# Patient Record
Sex: FEMALE | HISPANIC OR LATINO | Employment: FULL TIME | URBAN - METROPOLITAN AREA
[De-identification: names, ages, dates, MRNs, and addresses within clinical notes are randomized per-mention and may not be internally consistent; named-entity substitution may affect disease eponyms.]

---

## 2023-06-19 ENCOUNTER — APPOINTMENT (OUTPATIENT)
Dept: CT IMAGING | Facility: CLINIC | Age: 38
End: 2023-06-19
Attending: PHYSICIAN ASSISTANT

## 2023-06-19 ENCOUNTER — HOSPITAL ENCOUNTER (EMERGENCY)
Facility: CLINIC | Age: 38
Discharge: HOME | End: 2023-06-19
Attending: PHYSICIAN ASSISTANT | Admitting: PHYSICIAN ASSISTANT

## 2023-06-19 ENCOUNTER — APPOINTMENT (OUTPATIENT)
Dept: ULTRASOUND IMAGING | Facility: CLINIC | Age: 38
End: 2023-06-19
Attending: EMERGENCY MEDICINE

## 2023-06-19 VITALS
HEART RATE: 72 BPM | DIASTOLIC BLOOD PRESSURE: 80 MMHG | OXYGEN SATURATION: 99 % | WEIGHT: 141.09 LBS | RESPIRATION RATE: 16 BRPM | SYSTOLIC BLOOD PRESSURE: 123 MMHG | TEMPERATURE: 98.8 F

## 2023-06-19 DIAGNOSIS — N76.0 BACTERIAL VAGINOSIS: ICD-10-CM

## 2023-06-19 DIAGNOSIS — R10.2 PELVIC PAIN IN FEMALE: ICD-10-CM

## 2023-06-19 DIAGNOSIS — B96.89 BACTERIAL VAGINOSIS: ICD-10-CM

## 2023-06-19 LAB
ALBUMIN SERPL BCG-MCNC: 4.2 G/DL (ref 3.5–5.2)
ALBUMIN UR-MCNC: NEGATIVE MG/DL
ALP SERPL-CCNC: 78 U/L (ref 35–104)
ALT SERPL W P-5'-P-CCNC: 22 U/L (ref 0–50)
ANION GAP SERPL CALCULATED.3IONS-SCNC: 12 MMOL/L (ref 7–15)
APPEARANCE UR: CLEAR
AST SERPL W P-5'-P-CCNC: 17 U/L (ref 0–45)
BACTERIA #/AREA URNS HPF: ABNORMAL /HPF
BASOPHILS # BLD AUTO: 0 10E3/UL (ref 0–0.2)
BASOPHILS NFR BLD AUTO: 1 %
BILIRUB SERPL-MCNC: 0.2 MG/DL
BILIRUB UR QL STRIP: NEGATIVE
BUN SERPL-MCNC: 6.5 MG/DL (ref 6–20)
CALCIUM SERPL-MCNC: 9.7 MG/DL (ref 8.6–10)
CHLORIDE SERPL-SCNC: 104 MMOL/L (ref 98–107)
CLUE CELLS: PRESENT
COLOR UR AUTO: ABNORMAL
CREAT SERPL-MCNC: 0.51 MG/DL (ref 0.51–0.95)
DEPRECATED HCO3 PLAS-SCNC: 21 MMOL/L (ref 22–29)
EOSINOPHIL # BLD AUTO: 0.1 10E3/UL (ref 0–0.7)
EOSINOPHIL NFR BLD AUTO: 1 %
ERYTHROCYTE [DISTWIDTH] IN BLOOD BY AUTOMATED COUNT: 15.6 % (ref 10–15)
GFR SERPL CREATININE-BSD FRML MDRD: >90 ML/MIN/1.73M2
GLUCOSE SERPL-MCNC: 103 MG/DL (ref 70–99)
GLUCOSE UR STRIP-MCNC: NEGATIVE MG/DL
HCG UR QL: NEGATIVE
HCT VFR BLD AUTO: 35 % (ref 35–47)
HGB BLD-MCNC: 11 G/DL (ref 11.7–15.7)
HGB UR QL STRIP: NEGATIVE
IMM GRANULOCYTES # BLD: 0 10E3/UL
IMM GRANULOCYTES NFR BLD: 0 %
KETONES UR STRIP-MCNC: NEGATIVE MG/DL
LEUKOCYTE ESTERASE UR QL STRIP: ABNORMAL
LYMPHOCYTES # BLD AUTO: 3.5 10E3/UL (ref 0.8–5.3)
LYMPHOCYTES NFR BLD AUTO: 40 %
MCH RBC QN AUTO: 24.6 PG (ref 26.5–33)
MCHC RBC AUTO-ENTMCNC: 31.4 G/DL (ref 31.5–36.5)
MCV RBC AUTO: 78 FL (ref 78–100)
MONOCYTES # BLD AUTO: 0.7 10E3/UL (ref 0–1.3)
MONOCYTES NFR BLD AUTO: 8 %
MUCOUS THREADS #/AREA URNS LPF: PRESENT /LPF
NEUTROPHILS # BLD AUTO: 4.4 10E3/UL (ref 1.6–8.3)
NEUTROPHILS NFR BLD AUTO: 50 %
NITRATE UR QL: NEGATIVE
NRBC # BLD AUTO: 0 10E3/UL
NRBC BLD AUTO-RTO: 0 /100
PH UR STRIP: 5.5 [PH] (ref 5–7)
PLATELET # BLD AUTO: 359 10E3/UL (ref 150–450)
POTASSIUM SERPL-SCNC: 3.8 MMOL/L (ref 3.4–5.3)
PROT SERPL-MCNC: 7.6 G/DL (ref 6.4–8.3)
RBC # BLD AUTO: 4.48 10E6/UL (ref 3.8–5.2)
RBC URINE: <1 /HPF
SODIUM SERPL-SCNC: 137 MMOL/L (ref 136–145)
SP GR UR STRIP: 1.03 (ref 1–1.03)
SQUAMOUS EPITHELIAL: 1 /HPF
TRICHOMONAS, WET PREP: ABNORMAL
UROBILINOGEN UR STRIP-MCNC: NORMAL MG/DL
WBC # BLD AUTO: 8.6 10E3/UL (ref 4–11)
WBC URINE: 1 /HPF
WBC'S/HIGH POWER FIELD, WET PREP: ABNORMAL
YEAST, WET PREP: ABNORMAL

## 2023-06-19 PROCEDURE — 87210 SMEAR WET MOUNT SALINE/INK: CPT | Performed by: PHYSICIAN ASSISTANT

## 2023-06-19 PROCEDURE — 250N000011 HC RX IP 250 OP 636: Performed by: PHYSICIAN ASSISTANT

## 2023-06-19 PROCEDURE — 76830 TRANSVAGINAL US NON-OB: CPT

## 2023-06-19 PROCEDURE — 85018 HEMOGLOBIN: CPT | Performed by: EMERGENCY MEDICINE

## 2023-06-19 PROCEDURE — 81025 URINE PREGNANCY TEST: CPT | Performed by: PHYSICIAN ASSISTANT

## 2023-06-19 PROCEDURE — 250N000009 HC RX 250: Performed by: PHYSICIAN ASSISTANT

## 2023-06-19 PROCEDURE — 87591 N.GONORRHOEAE DNA AMP PROB: CPT | Performed by: PHYSICIAN ASSISTANT

## 2023-06-19 PROCEDURE — 81001 URINALYSIS AUTO W/SCOPE: CPT | Performed by: EMERGENCY MEDICINE

## 2023-06-19 PROCEDURE — 36415 COLL VENOUS BLD VENIPUNCTURE: CPT | Performed by: EMERGENCY MEDICINE

## 2023-06-19 PROCEDURE — 87491 CHLMYD TRACH DNA AMP PROBE: CPT | Performed by: PHYSICIAN ASSISTANT

## 2023-06-19 PROCEDURE — 99285 EMERGENCY DEPT VISIT HI MDM: CPT | Mod: 25

## 2023-06-19 PROCEDURE — 80053 COMPREHEN METABOLIC PANEL: CPT | Performed by: EMERGENCY MEDICINE

## 2023-06-19 PROCEDURE — 74177 CT ABD & PELVIS W/CONTRAST: CPT

## 2023-06-19 PROCEDURE — 96374 THER/PROPH/DIAG INJ IV PUSH: CPT | Mod: 59

## 2023-06-19 PROCEDURE — 250N000011 HC RX IP 250 OP 636: Performed by: EMERGENCY MEDICINE

## 2023-06-19 RX ORDER — IOPAMIDOL 755 MG/ML
71 INJECTION, SOLUTION INTRAVASCULAR ONCE
Status: COMPLETED | OUTPATIENT
Start: 2023-06-19 | End: 2023-06-19

## 2023-06-19 RX ORDER — KETOROLAC TROMETHAMINE 15 MG/ML
15 INJECTION, SOLUTION INTRAMUSCULAR; INTRAVENOUS ONCE
Status: COMPLETED | OUTPATIENT
Start: 2023-06-19 | End: 2023-06-19

## 2023-06-19 RX ORDER — METRONIDAZOLE 500 MG/1
500 TABLET ORAL 2 TIMES DAILY
Qty: 14 TABLET | Refills: 0 | Status: SHIPPED | OUTPATIENT
Start: 2023-06-19 | End: 2023-06-26

## 2023-06-19 RX ORDER — ONDANSETRON 4 MG/1
4 TABLET, ORALLY DISINTEGRATING ORAL ONCE
Status: COMPLETED | OUTPATIENT
Start: 2023-06-19 | End: 2023-06-19

## 2023-06-19 RX ADMIN — SODIUM CHLORIDE 60 ML: 9 INJECTION, SOLUTION INTRAVENOUS at 19:16

## 2023-06-19 RX ADMIN — ONDANSETRON 4 MG: 4 TABLET, ORALLY DISINTEGRATING ORAL at 18:51

## 2023-06-19 RX ADMIN — IOPAMIDOL 71 ML: 755 INJECTION, SOLUTION INTRAVENOUS at 19:16

## 2023-06-19 RX ADMIN — KETOROLAC TROMETHAMINE 15 MG: 15 INJECTION, SOLUTION INTRAMUSCULAR; INTRAVENOUS at 13:53

## 2023-06-19 ASSESSMENT — ACTIVITIES OF DAILY LIVING (ADL)
ADLS_ACUITY_SCORE: 35

## 2023-06-19 NOTE — ED PROVIDER NOTES
History     Chief Complaint:  Abdominal Pain     A  was used (Kyrgyz).      Raul Hayden is a 37 year old female with a past history of cysts and kidney infections who presents with abdominal pain. The patient states that yesterday ahe developed lower abdominal and pelvic pain. She states that the pain has stayed constant since yesterday. The pain is not crampy or sharp. Patient also has had some nausea and dysuria along with this along with a foul smelling odor when she urinates. She has been taking a tylenol like medication for the pain. She denies any shortness of breath, chest pain, diarrhea, blood stools, vomiting, rashes, or vaginal discharge. She states that a month ago she had simialr symptoms and was diagnosed with a kidney infection. She has had a tubal ligation and c section in the past. She denies any allergies or taking any current medication. She has no PCP.      Independent Historian:   None - Patient Only    Review of External Notes:   No records in Epic/CE for metro area      Medications:    The patient is currently on no regular medications.    Past Medical History:    Cyst   Kidney infection    Past Surgical History:    C section     Physical Exam     Patient Vitals for the past 24 hrs:   BP Temp Temp src Pulse Resp SpO2 Weight   06/19/23 1956 123/80 -- -- 76 -- 99 % --   06/19/23 1338 -- -- -- -- -- -- 64 kg (141 lb 1.5 oz)   06/19/23 1337 129/74 98.8  F (37.1  C) Oral 93 18 100 % --        Physical Exam  Vitals and nursing note reviewed.   Constitutional:       General: She is not in acute distress.     Appearance: Normal appearance. She is not ill-appearing, toxic-appearing or diaphoretic.   HENT:      Head: Normocephalic.      Right Ear: External ear normal.      Left Ear: External ear normal.      Mouth/Throat:      Mouth: Mucous membranes are moist.   Cardiovascular:      Rate and Rhythm: Normal rate and regular rhythm.      Pulses: Normal pulses.       Heart sounds: Normal heart sounds.   Pulmonary:      Effort: Pulmonary effort is normal. No respiratory distress.      Breath sounds: Normal breath sounds.   Abdominal:      General: There is no distension.      Palpations: Abdomen is soft.      Tenderness: There is abdominal tenderness. There is no rebound.      Comments: Mild TTP to lower abd.  No R/R/G/D.    Musculoskeletal:         General: Normal range of motion.      Cervical back: Normal range of motion and neck supple.   Skin:     General: Skin is warm.      Capillary Refill: Capillary refill takes less than 2 seconds.   Neurological:      General: No focal deficit present.      Mental Status: She is alert.   Psychiatric:         Mood and Affect: Mood normal.         Behavior: Behavior normal.         Thought Content: Thought content normal.         Judgment: Judgment normal.       Emergency Department Course     Imaging:  CT Abdomen Pelvis w Contrast   Final Result   IMPRESSION:    1.  No appendicitis.      2.  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            US Pelvis Cmplt w Transvag & Doppler LmtPel Duplex Limited   Final Result   IMPRESSION:  Trace fluid near the left ovary. No acute abnormality   identified.          SEN COX MD            SYSTEM ID:  [id number]         Report per radiology    Laboratory:  Labs Ordered and Resulted from Time of ED Arrival to Time of ED Departure   COMPREHENSIVE METABOLIC PANEL - Abnormal       Result Value    Sodium 137      Potassium 3.8      Chloride 104      Carbon Dioxide (CO2) 21 (*)     Anion Gap 12      Urea Nitrogen 6.5      Creatinine 0.51      Calcium 9.7      Glucose 103 (*)     Alkaline Phosphatase 78      AST 17      ALT 22      Protein Total 7.6      Albumin 4.2      Bilirubin Total 0.2      GFR Estimate >90     ROUTINE UA WITH MICROSCOPIC REFLEX TO CULTURE - Abnormal    Color Urine Light Yellow      Appearance Urine Clear      Glucose Urine Negative      Bilirubin Urine Negative       Ketones Urine Negative      Specific Gravity Urine 1.027      Blood Urine Negative      pH Urine 5.5      Protein Albumin Urine Negative      Urobilinogen Urine Normal      Nitrite Urine Negative      Leukocyte Esterase Urine Trace (*)     Bacteria Urine Few (*)     Mucus Urine Present (*)     RBC Urine <1      WBC Urine 1      Squamous Epithelials Urine 1     CBC WITH PLATELETS AND DIFFERENTIAL - Abnormal    WBC Count 8.6      RBC Count 4.48      Hemoglobin 11.0 (*)     Hematocrit 35.0      MCV 78      MCH 24.6 (*)     MCHC 31.4 (*)     RDW 15.6 (*)     Platelet Count 359      % Neutrophils 50      % Lymphocytes 40      % Monocytes 8      % Eosinophils 1      % Basophils 1      % Immature Granulocytes 0      NRBCs per 100 WBC 0      Absolute Neutrophils 4.4      Absolute Lymphocytes 3.5      Absolute Monocytes 0.7      Absolute Eosinophils 0.1      Absolute Basophils 0.0      Absolute Immature Granulocytes 0.0      Absolute NRBCs 0.0     WET PREPARATION - Abnormal    Trichomonas Absent      Yeast Absent      Clue Cells Present (*)     WBCs/high power field 3+ (*)    HCG QUALITATIVE URINE - Normal    hCG Urine Qualitative Negative     CHLAMYDIA TRACHOMATIS/NEISSERIA GONORRHOEAE BY PCR      Emergency Department Course & Assessments:    Interventions:  Medications   ketorolac (TORADOL) injection 15 mg (15 mg Intravenous $Given 6/19/23 1353)   ondansetron (ZOFRAN ODT) ODT tab 4 mg (4 mg Oral $Given 6/19/23 1851)   iopamidol (ISOVUE-370) solution 71 mL (71 mLs Intravenous $Given 6/19/23 1916)   Saline (60 mLs Intravenous $Given 6/19/23 1916)        Assessments:  1830 Obtained the patients history and performed initial exam  2025 Rechecked the patient and updated her on findings    Independent Interpretation (X-rays, CTs, rhythm strip):  None    Consultations/Discussion of Management or Tests:  ED Course as of 06/19/23 2030 Mon Jun 19, 2023 1957 Called lab to obtain the status of a wet prep for the patient        Social Determinants of Health affecting care:   Primary care follow up and language barrier    Disposition:  The patient was discharged to home.     Impression & Plan      Medical Decision Makin y/o Swiss speaking female presents with her family for evaluation of lower pelvic pain with mild burning with urination and mild vaginal discharge (when asked) and nausea that has been present since yesterday. Similar symptoms a month ago that was treated at unknown clinic with unknown medication.      Exam as above. Well appearing.  Mild TTP to lower pelvic area, no peritoneal signs. Pelvic U/S and labs reviewed that were obtained through PIT, unremarkable including neg UPT and UA.  Discussed will check CT as consider appy although lower susp. Also discussed with pelvic pain, neg UA and mild vaginal discharge will send vaginal swabs although lower susp this is PID.  They are happy with plan     Update: No new complaints.  Family remains at BS.  Pt remains well appearing, repeat vitals  Noted. Discussed reassuring eval here and she is felt stable for discharge.  Discussed will treat for BV. Discussed GC/chlam pending, made aware should be called if positive but to review results at F/U with provider. Provided contact to obtain PCP in the community. Pt and family educated on S/S that should prompt ED re-eval.  Questions answered. Verbalized understanding. Comfortable with plan and appreciative.     Diagnosis:    ICD-10-CM    1. Pelvic pain in female  R10.2       2. Bacterial vaginosis  N76.0     B96.89          Discharge Medications:  New Prescriptions    METRONIDAZOLE (FLAGYL) 500 MG TABLET    Take 1 tablet (500 mg) by mouth 2 times daily for 7 days        Scribe Disclosure:  Giovanny PENNINGTON, am serving as a scribe at 6:20 PM on 2023 to document services personally performed by Snehal Chaudhry PA-C based on my observations and the provider's statements to me.     2023   Snehal Chaudhry PA-C       Snehal Chaudhry PA-C  06/19/23 2034

## 2023-06-19 NOTE — ED TRIAGE NOTES
Patient presents to ED with lower abdominal pain that started yesterday. States she has had pain like this before and was dx with a cyst and kidney infection. Endorses intermittent painful urination.

## 2023-06-19 NOTE — ED NOTES
Rapid Assessment Note    History:   Raul Hayden is a 37 year old female who presents with abdominal pain. The patient states she had onset of pain across her bilateral lower abdomen and nausea yesterday. She had similar pain in the past after a kidney infection. She denies fevers. She endorses onset of intermittent dysuria last week.  She denies diarrhea or emesis. Her last menstrual cycle was . She has history of  section, however no other abdominal surgeries. She is not nursing or breastfeeding.  was used.     Exam:   General:  Alert, interactive  Cardiovascular:  Well perfused  Lungs:  No respiratory distress, no accessory muscle use  Abdomen:  Tenderness across the lower abdomen.   Neuro:  Moving all 4 extremities  Skin:  Warm, dry  Psych:  Normal affect    Plan of Care:   I evaluated the patient and developed an initial plan of care. I discussed this plan and explained that I, or one of my partners, would be returning to complete the evaluation.     I, Monique King, am serving as a scribe to document services personally performed by Eulalia Mcknight MD, based on my observations and the provider's statements to me.    2023  [employer]    Portions of this medical record were completed by a scribe. UPON MY REVIEW AND AUTHENTICATION BY ELECTRONIC SIGNATURE, this confirms (a) I performed the applicable clinical services, and (b) the record is accurate.      Eulalia Mcknight MD  23 [phone]

## 2023-06-20 ENCOUNTER — TELEPHONE (OUTPATIENT)
Dept: EMERGENCY MEDICINE | Facility: CLINIC | Age: 38
End: 2023-06-20

## 2023-06-20 DIAGNOSIS — A74.9 CHLAMYDIA INFECTION: ICD-10-CM

## 2023-06-20 LAB
C TRACH DNA SPEC QL PROBE+SIG AMP: POSITIVE
N GONORRHOEA DNA SPEC QL NAA+PROBE: NEGATIVE

## 2023-06-20 RX ORDER — DOXYCYCLINE 100 MG/1
100 CAPSULE ORAL 2 TIMES DAILY
Qty: 14 CAPSULE | Refills: 0 | Status: SHIPPED | OUTPATIENT
Start: 2023-06-20 | End: 2023-06-27

## 2023-06-20 NOTE — DISCHARGE INSTRUCTIONS
-We have tested you for gonorrhea and chlamydia.  This will take a few days to result.  If results require a change to your current treatment plan, you should be contacted and advised from there. However, be sure to review your results regardless with provider at follow-up (can also review on MyChart).

## 2023-06-20 NOTE — TELEPHONE ENCOUNTER
[facility] Emergency Department/Urgent Care Lab result notification  [Note:  ED Lab Results RN will reference the [facility] Emergency Dept visit note prior to contacting patient AND/OR prior to consulting Emergency Dept Provider.  Highlights of Emergency Dept visit in information summary at the bottom of this telephone note]    1. Reason for call    Notify of lab results    Assess patient symptoms [if necessary]    Review ED Providers recommendations/discharge instructions (if necessary)    Advise per [facility] ED lab result protocol     2. Lab Result (including Rx patient on, if applicable).  If culture, copy of lab report at bottom.  Final N. Gonorrhoeae PCR is [NEGATIVE] AND Chlamydia T PCR is [POSITIVE]  (Specimen source: vaginal swab)  Patient was treated for N. Gonorrhea AND/OR Chlamydia T in the [facility] Emergency Dept  [Yes or No]:  No       [facility] Emergency Dept discharge antibiotic (if prescribed): Flagyl for BV  Recommendations in treatment per [facility] ED lab result Chlamydia T. AND/OR N. Gonorrhea protocol    3. RN Assessment (Patient's current Symptoms):    Time of call: 12:37PM     Assessment: Per the patient who is with her niece who is translating for her, the patient declines a formal .     The patient states she is feeling a bit better. Is currently at the pharmacy to  the Flagyl that was prescribed yesterday.     Verified that the patient is not pregnant or breastfeeding.     4. RN Recommendations/Instructions per [place] ED lab result protocol    [facility] ED lab result protocol used: Chlamydia    Patient was notified of lab result and treatment recommendations    Advised to discontinue current antibiotic;  No, advised to take the Flagyl that was prescribed yesterday and to also start Doxycyline. Advised to not drink any alcohol while on the antibiotics.     Start Rx for Doxycycline 100 mg PO tablet, 1 tablet  (100 mg) by mouth 2 times daily for 7 days sent to [[facility]      RN reviewed information about Chlamydia from protocol patient information and RN reference guide.   STD Patient Instructions:  We recommend that you contact any recent sexual partners within the last 2 months and have them evaluated by a physician.  Avoid sexual activity for 7 to 10 days or until both you and your partner(s) have completed all antibiotic medications.  Contact PCP or return to the Emergency Dept within 24 hours if you are experiencing persistent or recurrent symptoms soon after completing therapy.  We advise that you consider following up with your PCP at approximately 3 months for retesting to be sure the infection has cleared.  Advised to keep her clinic appointment for today as well and to discuss with her provider when she should get another follow up.   Advised to return to ED with any worsening symptoms.   The patient is comfortable with the information given and has no further questions.     5. Please Contact your PCP clinic or return to the Emergency department if your:    Symptoms do not resolve after completing antibiotic.    Symptoms worsen or other concerning symptoms.    Natalee Arias RN  [facility]  Emergency Dept Lab Result RN  Ph# [phone]

## 2023-06-20 NOTE — TELEPHONE ENCOUNTER
[facility] Emergency Department/Urgent Care Lab result notification  [Note:  ED Lab Results RN will reference the [facility] Emergency Dept visit note prior to contacting patient AND/OR prior to consulting Emergency Dept Provider.  Highlights of Emergency Dept visit in information summary at the bottom of this telephone note]    1. Reason for call    Notify of lab results    Assess patient symptoms [if necessary]    Review ED Providers recommendations/discharge instructions (if necessary)    Advise per [facility] ED lab result protocol    2. Lab Result (including Rx patient on, if applicable).  If culture, copy of lab report at bottom.  Final N. Gonorrhoeae PCR is [NEGATIVE] AND Chlamydia T PCR is [POSITIVE]  (Specimen source: vaginal swab)  Patient was treated for N. Gonorrhea AND/OR Chlamydia T in the [facility] Emergency Dept  [Yes or No]:  No       [facility] Emergency Dept discharge antibiotic (if prescribed): Flagyl for BV  Recommendations in treatment per [facility] ED lab result Chlamydia T. AND/OR N. Gonorrhea protocol    3. RN Assessment (Patient's current Symptoms):  Time of call: 12:22PM  Left  message requesting a call back to [facility] ED Lab Result RN at [phone].  RN is available every day between 9 a.m. and 5:30 p.m.      Information summary from Emergency Dept/Urgent Care visit on 6/19/23  Symptoms reported at ED visit (Chief complaint, HPI) Abdominal Pain     A  was used (Japanese).      Raul Hayden is a 37 year old female with a past history of cysts and kidney infections who presents with abdominal pain. The patient states that yesterday ahe developed lower abdominal and pelvic pain. She states that the pain has stayed constant since yesterday. The pain is not crampy or sharp. Patient also has had some nausea and dysuria along with this along with a foul smelling odor when she urinates. She has been  taking a tylenol like medication for the pain. She denies any shortness of breath, chest pain, diarrhea, blood stools, vomiting, rashes, or vaginal discharge. She states that a month ago she had simialr symptoms and was diagnosed with a kidney infection. She has had a tubal ligation and c section in the past. She denies any allergies or taking any current medication. She has no PCP.   Significant Medical hx, if applicable (i.e. CKD, diabetes) None   Allergies No Known Allergies   Weight, if applicable Wt Readings from Last 2 Encounters:   06/19/23 64 kg (141 lb 1.5 oz)      Coumadin/Warfarin [Yes /No] No   Creatinine Level (mg/dl) Creatinine   Date Value Ref Range Status   06/19/2023 0.51 0.51 - 0.95 mg/dL Final      Creatinine clearance (ml/min), if applicable Creatinine clearance cannot be calculated (Unknown ideal weight.)   Pregnant (Yes/No/NA) HCG qual neg 6/19   Breastfeeding (Yes/No/NA) ?   ED providers Impression and Plan (applicable information) 38 y/o Telugu speaking female presents with her family for evaluation of lower pelvic pain with mild burning with urination and mild vaginal discharge (when asked) and nausea that has been present since yesterday. Similar symptoms a month ago that was treated at unknown clinic with unknown medication.       Exam as above. Well appearing.  Mild TTP to lower pelvic area, no peritoneal signs. Pelvic U/S and labs reviewed that were obtained through PIT, unremarkable including neg UPT and UA.  Discussed will check CT as consider appy although lower susp. Also discussed with pelvic pain, neg UA and mild vaginal discharge will send vaginal swabs although lower susp this is PID.  They are happy with plan      Update: No new complaints.  Family remains at BS.  Pt remains well appearing, repeat vitals  Noted. Discussed reassuring eval here and she is felt stable for discharge.  Discussed will treat for BV. Discussed GC/chlam pending, made aware should be called if positive  but to review results at F/U with provider. Provided contact to obtain PCP in the community. Pt and family educated on S/S that should prompt ED re-eval.  Questions answered. Verbalized understanding. Comfortable with plan and appreciative.    ED diagnosis 1. Pelvic pain in female  R10.2         2. Bacterial vaginosis  N76.0                ED provider Snehal Chaudhry PA-C        Copy of Lab report (if applicable)  Chlamydia trachomatis/Neisseria gonorrhoeae by PCR  Order: [id number]   Status: Final result      Visible to patient: No (inaccessible in MyChart)     Specimen Information: Vagina; Swab    1 Result Note          Component Ref Range & Units 1 d ago    Chlamydia Trachomatis Negative Positive Abnormal     Comment: Positive for C. trachomatis rRNA by transcription mediated amplification.   As is true for all non-culture methods, a positive specimen obtained from a patient after therapeutic treatment cannot be interpreted as indicating the presence of viable organism.    Neisseria gonorrhoeae Negative Negative    Comment: Negative for N. gonorrhoeae rRNA by transcription mediated amplification. A negative result by transcription mediated amplification does not preclude the presence of C. trachomatis infection because results are dependent on proper and adequate collection, absence of inhibitors and sufficient rRNA to be detected.   Resulting Agency  [facility]              Specimen Collected: 06/19/23  7:17 PM Last Resulted: 06/20/23 10:54 AM                 Natalee Arias RN  [facility] [place]  Emergency Dept Lab Result RN  Ph# [phone]

## 2023-07-18 ENCOUNTER — APPOINTMENT (OUTPATIENT)
Dept: ULTRASOUND IMAGING | Facility: CLINIC | Age: 38
End: 2023-07-18
Attending: EMERGENCY MEDICINE

## 2023-07-18 ENCOUNTER — HOSPITAL ENCOUNTER (EMERGENCY)
Facility: CLINIC | Age: 38
Discharge: HOME | End: 2023-07-18
Attending: EMERGENCY MEDICINE | Admitting: EMERGENCY MEDICINE

## 2023-07-18 VITALS
HEART RATE: 75 BPM | DIASTOLIC BLOOD PRESSURE: 74 MMHG | WEIGHT: 140 LBS | OXYGEN SATURATION: 100 % | TEMPERATURE: 97.9 F | SYSTOLIC BLOOD PRESSURE: 136 MMHG | RESPIRATION RATE: 16 BRPM

## 2023-07-18 DIAGNOSIS — N39.0 UTI (URINARY TRACT INFECTION) WITH PYURIA: ICD-10-CM

## 2023-07-18 LAB
ALBUMIN UR-MCNC: NEGATIVE MG/DL
ANION GAP SERPL CALCULATED.3IONS-SCNC: 9 MMOL/L (ref 7–15)
APPEARANCE UR: CLEAR
BASOPHILS # BLD AUTO: 0 10E3/UL (ref 0–0.2)
BASOPHILS NFR BLD AUTO: 1 %
BILIRUB UR QL STRIP: NEGATIVE
BUN SERPL-MCNC: 4.2 MG/DL (ref 6–20)
CALCIUM SERPL-MCNC: 9.8 MG/DL (ref 8.6–10)
CHLORIDE SERPL-SCNC: 105 MMOL/L (ref 98–107)
CLUE CELLS: ABNORMAL
COLOR UR AUTO: ABNORMAL
CREAT SERPL-MCNC: 0.49 MG/DL (ref 0.51–0.95)
DEPRECATED HCO3 PLAS-SCNC: 25 MMOL/L (ref 22–29)
EOSINOPHIL # BLD AUTO: 0.1 10E3/UL (ref 0–0.7)
EOSINOPHIL NFR BLD AUTO: 2 %
ERYTHROCYTE [DISTWIDTH] IN BLOOD BY AUTOMATED COUNT: 16.1 % (ref 10–15)
GFR SERPL CREATININE-BSD FRML MDRD: >90 ML/MIN/1.73M2
GLUCOSE SERPL-MCNC: 94 MG/DL (ref 70–99)
GLUCOSE UR STRIP-MCNC: NEGATIVE MG/DL
HCG UR QL: NEGATIVE
HCT VFR BLD AUTO: 34.6 % (ref 35–47)
HGB BLD-MCNC: 10.6 G/DL (ref 11.7–15.7)
HGB UR QL STRIP: NEGATIVE
IMM GRANULOCYTES # BLD: 0 10E3/UL
IMM GRANULOCYTES NFR BLD: 1 %
KETONES UR STRIP-MCNC: NEGATIVE MG/DL
LEUKOCYTE ESTERASE UR QL STRIP: ABNORMAL
LYMPHOCYTES # BLD AUTO: 3.6 10E3/UL (ref 0.8–5.3)
LYMPHOCYTES NFR BLD AUTO: 42 %
MCH RBC QN AUTO: 24.5 PG (ref 26.5–33)
MCHC RBC AUTO-ENTMCNC: 30.6 G/DL (ref 31.5–36.5)
MCV RBC AUTO: 80 FL (ref 78–100)
MONOCYTES # BLD AUTO: 0.8 10E3/UL (ref 0–1.3)
MONOCYTES NFR BLD AUTO: 9 %
MUCOUS THREADS #/AREA URNS LPF: PRESENT /LPF
NEUTROPHILS # BLD AUTO: 4 10E3/UL (ref 1.6–8.3)
NEUTROPHILS NFR BLD AUTO: 45 %
NITRATE UR QL: NEGATIVE
NRBC # BLD AUTO: 0 10E3/UL
NRBC BLD AUTO-RTO: 0 /100
PH UR STRIP: 7 [PH] (ref 5–7)
PLATELET # BLD AUTO: 310 10E3/UL (ref 150–450)
POTASSIUM SERPL-SCNC: 4.2 MMOL/L (ref 3.4–5.3)
RBC # BLD AUTO: 4.33 10E6/UL (ref 3.8–5.2)
RBC URINE: 1 /HPF
SODIUM SERPL-SCNC: 139 MMOL/L (ref 136–145)
SP GR UR STRIP: 1.02 (ref 1–1.03)
SQUAMOUS EPITHELIAL: 3 /HPF
TRICHOMONAS, WET PREP: ABNORMAL
UROBILINOGEN UR STRIP-MCNC: NORMAL MG/DL
WBC # BLD AUTO: 8.7 10E3/UL (ref 4–11)
WBC URINE: 19 /HPF
WBC'S/HIGH POWER FIELD, WET PREP: ABNORMAL
YEAST, WET PREP: ABNORMAL

## 2023-07-18 PROCEDURE — 36415 COLL VENOUS BLD VENIPUNCTURE: CPT | Performed by: EMERGENCY MEDICINE

## 2023-07-18 PROCEDURE — 81001 URINALYSIS AUTO W/SCOPE: CPT | Performed by: EMERGENCY MEDICINE

## 2023-07-18 PROCEDURE — 93976 VASCULAR STUDY: CPT

## 2023-07-18 PROCEDURE — 87210 SMEAR WET MOUNT SALINE/INK: CPT | Performed by: EMERGENCY MEDICINE

## 2023-07-18 PROCEDURE — 87491 CHLMYD TRACH DNA AMP PROBE: CPT | Performed by: EMERGENCY MEDICINE

## 2023-07-18 PROCEDURE — 85025 COMPLETE CBC W/AUTO DIFF WBC: CPT | Performed by: EMERGENCY MEDICINE

## 2023-07-18 PROCEDURE — 99284 EMERGENCY DEPT VISIT MOD MDM: CPT | Mod: 25

## 2023-07-18 PROCEDURE — 80048 BASIC METABOLIC PNL TOTAL CA: CPT | Performed by: EMERGENCY MEDICINE

## 2023-07-18 PROCEDURE — 87088 URINE BACTERIA CULTURE: CPT | Performed by: EMERGENCY MEDICINE

## 2023-07-18 PROCEDURE — 250N000013 HC RX MED GY IP 250 OP 250 PS 637: Performed by: EMERGENCY MEDICINE

## 2023-07-18 PROCEDURE — 87591 N.GONORRHOEAE DNA AMP PROB: CPT | Performed by: EMERGENCY MEDICINE

## 2023-07-18 PROCEDURE — 81025 URINE PREGNANCY TEST: CPT | Performed by: EMERGENCY MEDICINE

## 2023-07-18 RX ORDER — CEPHALEXIN 500 MG/1
1000 CAPSULE ORAL ONCE
Status: COMPLETED | OUTPATIENT
Start: 2023-07-18 | End: 2023-07-18

## 2023-07-18 RX ORDER — CEPHALEXIN 500 MG/1
1000 CAPSULE ORAL ONCE
Status: DISCONTINUED | OUTPATIENT
Start: 2023-07-18 | End: 2023-07-18

## 2023-07-18 RX ORDER — DOXYCYCLINE 100 MG/1
100 CAPSULE ORAL 2 TIMES DAILY
Qty: 20 CAPSULE | Refills: 0 | Status: SHIPPED | OUTPATIENT
Start: 2023-07-18 | End: 2023-07-28

## 2023-07-18 RX ORDER — CEPHALEXIN 500 MG/1
500 CAPSULE ORAL 3 TIMES DAILY
Qty: 21 CAPSULE | Refills: 0 | Status: SHIPPED | OUTPATIENT
Start: 2023-07-18 | End: 2023-07-25

## 2023-07-18 RX ADMIN — CEPHALEXIN 1000 MG: 500 CAPSULE ORAL at 17:52

## 2023-07-18 ASSESSMENT — ACTIVITIES OF DAILY LIVING (ADL): ADLS_ACUITY_SCORE: 35

## 2023-07-18 NOTE — ED NOTES
Tele-PIT/Intake Evaluation      Video-Visit Details    Type of service:  Video Visit    Video Start Time (time video started): 3:15 PM  Video End Time (time video stopped): 3:21 PM   Originating Location (pt. Location):  [facility]  Distant Location (provider location):  [facility]  Mode of Communication:  Video Conference via [employer]  Patient verbally consented to [facility] televisit.    History:  Patient with suprapubic and pelvic pain, present for the last 2 to 3 days, no nausea or vomiting or fevers.  Feels consistent with a previous episode of chlamydia.    Exam:  General:  Alert, interactive  Cardiovascular:  Well perfused  Lungs:  No respiratory distress, no accessory muscle use  Neuro:  Moving all 4 extremities  Skin:  Warm, dry  Psych:  Normal affect  Patient Vitals for the past 24 hrs:   BP Temp Temp src Pulse Resp SpO2 Weight   07/18/23 1512 136/74 97.9  F (36.6  C) Temporal 75 16 100 % 63.5 kg (140 lb)       Appropriate interventions for symptom management were initiated if applicable.  Appropriate diagnostic tests were initiated if indicated.    Important information for subsequent clinician:  Laboratory work-up and ultrasound ordered.    I briefly evaluated the patient and developed an initial plan of care. I discussed this plan and explained that this brief interaction does not constitute a full evaluation. Patient/family understands that they should wait to be fully evaluated and discuss any test results with another clinician prior to leaving the hospital.     Chaparro Traore MD  07/18/23 9891

## 2023-07-18 NOTE — ED PROVIDER NOTES
History     Chief Complaint:  Pelvic Pain       HPI   Raul Hayden is a 37 year old female who presents to the ED with her niece and  for pelvic pain, dysuria, and possible slight increase in vaginal discharge which started on Sunday. She reports a history of UTI.  The patient does have a history of a chlamydial infection in the past.  She was wondering if this could have come back.    Patient does not have any significant fevers chills or night sweats.    A  was used.       Independent Historian:   None - Patient Only    Review of External Notes:   None    Medications:    The patient is not currently taking any prescribed medications.     Past Medical History:    UTI  Chlamydia     Physical Exam     Patient Vitals for the past 24 hrs:   BP Temp Temp src Pulse Resp SpO2 Weight   07/18/23 1512 136/74 97.9  F (36.6  C) Temporal 75 16 100 % 63.5 kg (140 lb)        Physical Exam  General: Resting on the gurney  Head:  The scalp, face, and head appear normal  Eyes:  The pupils are equal, round, and reactive to light    There is no nystagmus    Extraocular muscles are intact    Conjunctivae and sclerae are normal  ENT:    The nose is normal    Pinnae are normal  Neck:  Normal range of motion    There is no rigidity noted    There is no midline cervical spine pain/tenderness    Trachea is in the midline    No mass is detected  CV:  Regular rate and underlying rhythm     Normal S1/S2, no S3/S4    No pathological murmur detected  Resp:  Lungs are clear    There is no tachypnea    Non-labored    No rales    No wheezing   GI:  Abdomen is soft, there is no rigidity    No distension    No tympani    No rebound tenderness     Non-surgical without peritoneal features  Skin:  No rash or acute skin lesions noted  Neuro: Speech is normal and fluent  Psych:  Awake. Alert.      Normal affect.  Appropriate interactions.  Lymph: No anterior cervical lymphadenopathy noted            Emergency  Department Course     Imaging:  US Pelvis Cmplt w Transvag & Doppler LmtPel Duplex Limited   Final Result   IMPRESSION:     1.  No torsion demonstrated.   2.  Simple left paraovarian cyst.                  Report per radiology    Laboratory:  Labs Ordered and Resulted from Time of ED Arrival to Time of ED Departure   BASIC METABOLIC PANEL - Abnormal       Result Value    Sodium 139      Potassium 4.2      Chloride 105      Carbon Dioxide (CO2) 25      Anion Gap 9      Urea Nitrogen 4.2 (*)     Creatinine 0.49 (*)     Calcium 9.8      Glucose 94      GFR Estimate >90     ROUTINE UA WITH MICROSCOPIC REFLEX TO CULTURE - Abnormal    Color Urine Light Yellow      Appearance Urine Clear      Glucose Urine Negative      Bilirubin Urine Negative      Ketones Urine Negative      Specific Gravity Urine 1.020      Blood Urine Negative      pH Urine 7.0      Protein Albumin Urine Negative      Urobilinogen Urine Normal      Nitrite Urine Negative      Leukocyte Esterase Urine Moderate (*)     Mucus Urine Present (*)     RBC Urine 1      WBC Urine 19 (*)     Squamous Epithelials Urine 3 (*)    CBC WITH PLATELETS AND DIFFERENTIAL - Abnormal    WBC Count 8.7      RBC Count 4.33      Hemoglobin 10.6 (*)     Hematocrit 34.6 (*)     MCV 80      MCH 24.5 (*)     MCHC 30.6 (*)     RDW 16.1 (*)     Platelet Count 310      % Neutrophils 45      % Lymphocytes 42      % Monocytes 9      % Eosinophils 2      % Basophils 1      % Immature Granulocytes 1      NRBCs per 100 WBC 0      Absolute Neutrophils 4.0      Absolute Lymphocytes 3.6      Absolute Monocytes 0.8      Absolute Eosinophils 0.1      Absolute Basophils 0.0      Absolute Immature Granulocytes 0.0      Absolute NRBCs 0.0     HCG QUALITATIVE URINE - Normal    hCG Urine Qualitative Negative     WET PREPARATION   CHLAMYDIA TRACHOMATIS PCR   NEISSERIA GONORRHOEAE PCR   URINE CULTURE      Emergency Department Course & Assessments:    Interventions:  Medications   cephALEXin (KEFLEX)  capsule 1,000 mg (has no administration in time range)        Assessments:  1717 I obtained the history and examined the patient as detailed above.     Independent Interpretation (X-rays, CTs, rhythm strip):      Consultations/Discussion of Management or Tests:  None     Social Determinants of Health affecting care:   None    Disposition:  The patient was discharged to home.     Impression & Plan      Medical Decision Making:  This patient presents to the emergency department with dysuria, frequency, and urgency.  She does have a history of UTI and reportedly chlamydia in the past.  Patient provided a urine specimen which shows pyuria likely consistent with bladder infection.  She did provide a swab from the vagina to screen and monitor for possible chlamydial infection which she was concerned about.  I will start her empirically on Keflex and doxycycline while we await results.  Clinical exam is not consistent with a significant abdominal pain or pelvic pain at this time.  There is no obvious clinical evidence of pelvic inflammatory disease.  She is mainly having some subjective dysuria but not a lot of focal suprapubic or pelvic abdominal pain.  We will wait for her urine culture results and the culture nurse will contact the patient if a change in management is needed.    Diagnosis:    ICD-10-CM    1. UTI (urinary tract infection) with pyuria  N39.0            Discharge Medications:  New Prescriptions    CEPHALEXIN (KEFLEX) 500 MG CAPSULE    Take 1 capsule (500 mg) by mouth 3 times daily for 7 days    DOXYCYCLINE HYCLATE (VIBRAMYCIN) 100 MG CAPSULE    Take 1 capsule (100 mg) by mouth 2 times daily for 10 days          Scribe Disclosure:  Fredrick PENNINGTON, am serving as a scribe at 5:50 PM on 7/18/2023 to document services personally performed by Go Marin MD based on my observations and the provider's statements to me.   7/18/2023   Go Marin MD Rock, Michael P, MD  07/18/23 3827

## 2023-07-18 NOTE — DISCHARGE INSTRUCTIONS
Take medications as directed.  We will call you if your urine culture or chlamydia test is positive

## 2023-07-19 LAB
C TRACH DNA SPEC QL NAA+PROBE: NEGATIVE
N GONORRHOEA DNA SPEC QL NAA+PROBE: NEGATIVE

## 2023-07-20 LAB
BACTERIA UR CULT: ABNORMAL
BACTERIA UR CULT: ABNORMAL

## 2023-07-21 ENCOUNTER — TELEPHONE (OUTPATIENT)
Dept: EMERGENCY MEDICINE | Facility: CLINIC | Age: 38
End: 2023-07-21

## 2023-07-21 RX ORDER — CEFPODOXIME PROXETIL 100 MG/1
100 TABLET, FILM COATED ORAL 2 TIMES DAILY
Qty: 10 TABLET | Refills: 0 | Status: SHIPPED | OUTPATIENT
Start: 2023-07-21 | End: 2023-07-26

## 2023-07-21 NOTE — TELEPHONE ENCOUNTER
[facility] Emergency Department/Urgent Care Lab result notification  [Note:  ED Lab Results RN will reference the [facility] Emergency Dept visit note prior to contacting patient AND/OR prior to consulting Emergency Dept Provider.  Highlights of Emergency Dept visit in information summary at the bottom of this telephone note]    1. Reason for call    Notify of lab results    Assess patient symptoms [if necessary]    Review ED Providers recommendations/discharge instructions (if necessary)    Advise per [facility] lab result protocol    2. Lab Result (including Rx patient on, if applicable).  If culture, copy of lab report at bottom.  Final Urine Culture Report on 7/21/23  [facility] Emergency Dept discharge antibiotic prescribed: Cephalexin (Keflex) 500 mg capsule, 1 capsule (500 mg) by mouth 3 times daily for 7 days. AND Doxycycline 100 mg PO tablet, 1 tablet (100 mg) by mouth 2 times daily for 10 days   #1. Bacteria, >100,000 CFU/ML Enterobacter cloacae complex is SUSCEPTIBLE to Antibiotic.    No change in treatment per [facility] lab result Urine Culture protocol.    3. RN Assessment (Patient's current Symptoms):    Time of call: 10:26AM;  Left message with her  requesting a call back    4. RN Recommendations/Instructions per [facility] ED lab result protocol    [facility] lab result protocol used: Urine culture    Kai was not notified of lab result and treatment recommendations  Left voicemail message requesting a call back to [facility] ED Lab Result RN at [phone].  RN is available every day between 9 a.m. and 5:30 p.m.      Information summary from Emergency Dept/Urgent Care visit on 7/18/23  Symptoms reported at ED visit (Chief complaint, HPI) Chief Complaint:  Pelvic Pain      HPI   Raul Hayden is a 37 year old female who presents to the ED with her niece and  for pelvic pain, dysuria, and possible slight increase in vaginal  discharge which started on Sunday. She reports a history of UTI.  The patient does have a history of a chlamydial infection in the past.  She was wondering if this could have come back.     Patient does not have any significant fevers chills or night sweats.     A  was used.    Significant Medical hx, if applicable (i.e. CKD, diabetes) Reviewed    Allergies No Known Allergies   Weight, if applicable Wt Readings from Last 2 Encounters:   07/18/23 63.5 kg (140 lb)   06/19/23 64 kg (141 lb 1.5 oz)      Coumadin/Warfarin [Yes /No] No   Creatinine Level (mg/dl) Creatinine   Date Value Ref Range Status   07/18/2023 0.49 (L) 0.51 - 0.95 mg/dL Final      Creatinine clearance (ml/min), if applicable Creatinine clearance cannot be calculated (Unknown ideal weight.)   Pregnant (Yes/No/NA) HCG qual Negative   Breastfeeding (Yes/No/NA) NA   ED providers Impression and Plan (applicable information) Medical Decision Making:  This patient presents to the emergency department with dysuria, frequency, and urgency.  She does have a history of UTI and reportedly chlamydia in the past.  Patient provided a urine specimen which shows pyuria likely consistent with bladder infection.  She did provide a swab from the vagina to screen and monitor for possible chlamydial infection which she was concerned about.  I will start her empirically on Keflex and doxycycline while we await results.  Clinical exam is not consistent with a significant abdominal pain or pelvic pain at this time.  There is no obvious clinical evidence of pelvic inflammatory disease.  She is mainly having some subjective dysuria but not a lot of focal suprapubic or pelvic abdominal pain.  We will wait for her urine culture results and the culture nurse will contact the patient if a change in management is needed.   ED diagnosis UTI   ED provider Go Marin MD      Copy of Lab report (if applicable)        Eric Hernández RN  [facility]  [facility]  Emergency Dept Lab Result RN  Ph# [phone]

## 2023-07-21 NOTE — RESULT ENCOUNTER NOTE
Left voicemail message requesting a call back to [facility] ED Lab Result RN at [phone].  RN is available every day between 9 a.m. and 5:30 p.m.  See Telephone encounter.

## 2023-07-21 NOTE — RESULT ENCOUNTER NOTE
Final Urine Culture Report on 7/21/23  [facility] Emergency Dept discharge antibiotic prescribed: Cephalexin (Keflex) 500 mg capsule, 1 capsule (500 mg) by mouth 3 times daily for 7 days. AND Doxycycline 100 mg PO tablet, 1 tablet (100 mg) by mouth 2 times daily for 10 days   #1. Bacteria, >100,000 CFU/ML Enterobacter cloacae complex is SUSCEPTIBLE to Antibiotic.    No change in treatment per [facility] ED lab result Urine Culture protocol.

## 2023-07-21 NOTE — RESULT ENCOUNTER NOTE
Note on 7/21/23 at 10:34 should of noted bacteria is resistant to antibiotic  Change in treatment recommendation.  See Dianne note at 11:35am

## 2023-07-21 NOTE — TELEPHONE ENCOUNTER
[facility] Emergency Department/Urgent Care Lab result notification:    Reason for call    Notify the patient/parent of lab results    Assess patient symptoms (if applicable)    Review ED providers recommendations/discharge instructions (if necessary)    Advise per [facility] ED lab result protocol    Lab result  Final Urine Culture Report on 7/21/23  [facility] Emergency Dept discharge antibiotic prescribed: Cephalexin (Keflex) 500 mg capsule, 1 capsule (500 mg) by mouth 3 times daily for 7 days. AND Doxycycline 100 mg PO tablet, 1 tablet (100 mg) by mouth 2 times daily for 10 days   #1. Bacteria, >100,000 CFU/ML Enterobacter cloacae complex is Resistant to Antibiotic.   Change in treatment per [facility] ED lab result Urine Culture protocol.   11:24a Relayed results to patient. Discussed need to change antibiotic. Questioned how patient was feeling. She states she is feeling better. No fever or chills. Will send in replacement antibiotic Cefpodoxime (Vantin) 100 MG tablet, 1 tablet (100 mg) by mouth 2 times daily for 5 days to [facility]  She will stop the Kelfex and start Vantin today.  Patient stated understanding and had no questions.  Dianne Mena, TARA  [facility] Result RN  [facility] Emergency Dept Lab Result RN  Ph# [phone]